# Patient Record
Sex: FEMALE | Race: WHITE | Employment: PART TIME | URBAN - METROPOLITAN AREA
[De-identification: names, ages, dates, MRNs, and addresses within clinical notes are randomized per-mention and may not be internally consistent; named-entity substitution may affect disease eponyms.]

---

## 2017-08-25 ENCOUNTER — HOSPITAL ENCOUNTER (EMERGENCY)
Facility: CLINIC | Age: 22
Discharge: HOME | End: 2017-08-25
Attending: EMERGENCY MEDICINE
Payer: COMMERCIAL

## 2017-08-25 ENCOUNTER — APPOINTMENT (OUTPATIENT)
Dept: GENERAL RADIOLOGY | Facility: CLINIC | Age: 22
End: 2017-08-25
Payer: COMMERCIAL

## 2017-08-25 VITALS
SYSTOLIC BLOOD PRESSURE: 123 MMHG | HEIGHT: 60 IN | RESPIRATION RATE: 16 BRPM | BODY MASS INDEX: 27.48 KG/M2 | TEMPERATURE: 97.8 F | DIASTOLIC BLOOD PRESSURE: 73 MMHG | HEART RATE: 60 BPM | WEIGHT: 140 LBS | OXYGEN SATURATION: 97 %

## 2017-08-25 DIAGNOSIS — M54.6 ACUTE MIDLINE THORACIC BACK PAIN: Primary | ICD-10-CM

## 2017-08-25 PROCEDURE — 72072 X-RAY EXAM THORAC SPINE 3VWS: CPT

## 2017-08-25 PROCEDURE — 99283 EMERGENCY DEPT VISIT LOW MDM: CPT

## 2017-08-25 RX ORDER — IBUPROFEN 600 MG/1
600 TABLET ORAL EVERY 6 HOURS PRN
Qty: 30 TABLET | Refills: 0 | Status: SHIPPED | OUTPATIENT
Start: 2017-08-25

## 2017-08-25 ASSESSMENT — PAIN SCALES - GENERAL: PAINLEVEL_OUTOF10: 7

## 2017-08-25 ASSESSMENT — PAIN DESCRIPTION - PAIN TYPE: TYPE: CHRONIC PAIN

## 2017-08-25 ASSESSMENT — PAIN DESCRIPTION - LOCATION: LOCATION: BACK

## 2017-08-25 ASSESSMENT — PAIN DESCRIPTION - DESCRIPTORS: DESCRIPTORS: THROBBING

## 2019-04-30 ENCOUNTER — HOSPITAL ENCOUNTER (EMERGENCY)
Facility: CLINIC | Age: 24
Discharge: HOME | End: 2019-04-30
Attending: SPECIALIST
Payer: COMMERCIAL

## 2019-04-30 VITALS
TEMPERATURE: 98.2 F | RESPIRATION RATE: 16 BRPM | OXYGEN SATURATION: 94 % | HEIGHT: 60 IN | HEART RATE: 69 BPM | WEIGHT: 135 LBS | DIASTOLIC BLOOD PRESSURE: 63 MMHG | SYSTOLIC BLOOD PRESSURE: 103 MMHG | BODY MASS INDEX: 26.5 KG/M2

## 2019-04-30 DIAGNOSIS — J02.9 VIRAL PHARYNGITIS: Primary | ICD-10-CM

## 2019-04-30 DIAGNOSIS — H92.03 OTALGIA OF BOTH EARS: ICD-10-CM

## 2019-04-30 LAB
DIRECT EXAM: NORMAL
Lab: NORMAL
SPECIMEN DESCRIPTION: NORMAL

## 2019-04-30 PROCEDURE — 99282 EMERGENCY DEPT VISIT SF MDM: CPT

## 2019-04-30 PROCEDURE — 87880 STREP A ASSAY W/OPTIC: CPT

## 2019-04-30 RX ORDER — TRAZODONE HYDROCHLORIDE 50 MG/1
50 TABLET ORAL NIGHTLY
COMMUNITY
End: 2020-08-09

## 2019-04-30 RX ORDER — IBUPROFEN 600 MG/1
600 TABLET ORAL EVERY 6 HOURS PRN
Qty: 20 TABLET | Refills: 0 | Status: SHIPPED | OUTPATIENT
Start: 2019-04-30 | End: 2019-05-07

## 2019-04-30 ASSESSMENT — PAIN DESCRIPTION - PAIN TYPE: TYPE: ACUTE PAIN

## 2019-04-30 ASSESSMENT — ENCOUNTER SYMPTOMS
COUGH: 1
TROUBLE SWALLOWING: 1
RHINORRHEA: 1
SORE THROAT: 1
SHORTNESS OF BREATH: 0
WHEEZING: 0

## 2019-04-30 ASSESSMENT — PAIN SCALES - GENERAL: PAINLEVEL_OUTOF10: 8

## 2019-04-30 ASSESSMENT — PAIN DESCRIPTION - LOCATION: LOCATION: EAR;THROAT

## 2019-04-30 NOTE — ED PROVIDER NOTES
[facility]  [address] [address]  Phone: [phone]      Pt Name: Felipa Yañez  MRN: [id number]  [place] 1995  Date of evaluation: 4/30/2019      CHIEF COMPLAINT       Chief Complaint   Patient presents with    Pharyngitis     started Saturday, painful to swallow    Otalgia     bilateral ear pain started Saturday    Nasal Congestion         HISTORY OF PRESENT ILLNESS    Felipa Yañez is a 22 y.o. female who presents   Chief Complaint   Patient presents with    Pharyngitis     started Saturday, painful to swallow    Otalgia     bilateral ear pain started Saturday    Nasal Congestion   . 71-year-old female patient presents to the emergency department complaining of sore throat since last 3 days associated with pain with the swallowing. She has had mild nonproductive cough, runny nose and congestion as well as complains of bilateral earache. She describes ear pain as a daily aching in nature 8 out of 10 in intensity. There are no exacerbating or relieving factors. She has taken Tylenol for the headache last night with slight relief. She denies any discharge or drainage from the ear,. She denies any chest pain, abdominal pain, shortness of breath, vomiting or diarrhea. She denies any sick or ill contacts or recent travels. REVIEW OF SYSTEMS       Review of Systems   Constitutional: Negative for chills and fever. HENT: Positive for congestion, rhinorrhea, sore throat and trouble swallowing. Negative for ear discharge. Respiratory: Positive for cough. Negative for shortness of breath and wheezing. Neurological: Positive for headaches. All other systems reviewed and are negative. PAST MEDICAL HISTORY    has a past medical history of Depression. SURGICAL HISTORY      has no past surgical history on file.     CURRENT MEDICATIONS       Discharge Medication List as of 4/30/2019  1:31 PM      CONTINUE these medications which have NOT CHANGED Details   traZODone (DESYREL) 50 MG tablet Take 50 mg by mouth nightlyHistorical Med      !! ibuprofen (ADVIL;MOTRIN) 600 MG tablet Take 1 tablet by mouth every 6 hours as needed for Pain, Disp-30 tablet, R-0Print       !! - Potential duplicate medications found. Please discuss with provider. ALLERGIES     has No Known Allergies. FAMILY HISTORY     indicated that the status of her maternal grandmother is unknown. She indicated that the status of her maternal grandfather is unknown. She indicated that the status of her paternal grandmother is unknown.     family history includes Diabetes in her maternal grandfather and maternal grandmother; Hypertension in her maternal grandmother; Thyroid Disease in her maternal grandmother and paternal grandmother. SOCIAL HISTORY      reports that she has never smoked. She has never used smokeless tobacco. She reports that she drinks alcohol. She reports that she does not use drugs. PHYSICAL EXAM     INITIAL VITALS:  height is 5' (1.524 m) and weight is 61.2 kg (135 lb). Her oral temperature is 98.2 °F (36.8 °C). Her blood pressure is 103/63 and her pulse is 69. Her respiration is 16 and oxygen saturation is 94%. Physical Exam   Constitutional: She is oriented to person, place, and time. She appears well-developed and well-nourished. HENT:   Head: Normocephalic and atraumatic. Nose: Nose normal.   Mouth/Throat: Oropharynx is clear and moist.   Eyes: Pupils are equal, round, and reactive to light. EOM are normal.   Neck: Normal range of motion. Neck supple. Cardiovascular: Normal rate, regular rhythm, normal heart sounds and intact distal pulses. No murmur heard. Pulmonary/Chest: Effort normal and breath sounds normal. No respiratory distress. Abdominal: Soft. Bowel sounds are normal. She exhibits no distension. There is no tenderness. Neurological: She is alert and oriented to person, place, and time. Skin: Skin is warm and dry.    Nursing note and vitals reviewed. DIFFERENTIAL DIAGNOSIS/ MDM:     Viral URI with cough, bronchitis, streptococcal pharyngitis, otalgia    DIAGNOSTIC RESULTS     EKG: All EKG's are interpreted by the Emergency Department Physician who either signs or Co-signs this chart in the absence of a cardiologist.    None obtained    RADIOLOGY:   I directly visualized the following  images and reviewed the radiologist interpretations:  No orders to display      None obtained      LABS:  Labs Reviewed   STREP SCREEN GROUP A THROAT       Rapid strep screen is negative    EMERGENCY DEPARTMENT COURSE:   Vitals:    Vitals:    04/30/19 1204   BP: 103/63   Pulse: 69   Resp: 16   Temp: 98.2 °F (36.8 °C)   TempSrc: Oral   SpO2: 94%   Weight: 61.2 kg (135 lb)   Height: 5' (1.524 m)     -------------------------  BP: 103/63, Temp: 98.2 °F (36.8 °C), Pulse: 69, Resp: 16    Orders Placed This Encounter   Medications    ibuprofen (IBU) 600 MG tablet     Sig: Take 1 tablet by mouth every 6 hours as needed for Pain     Dispense:  20 tablet     Refill:  0       Patient appears to have viral illness and will be treated symptomatically at this time. She is advised take Tylenol and/or ibuprofen as needed for the pain or headache, plenty of oral fluids, follow-up with PCP, return if worse. I have reviewed the disposition diagnosis with the patient and or their family/guardian. I have answered their questions and given discharge instructions. They voiced understanding of these instructions and did not have any further questions or complaints. Re-evaluation Notes    Patient is resting comfortably and does not appear to be in any pain or distress. PROCEDURES:  None    FINAL IMPRESSION      1. Viral pharyngitis    2.  Otalgia of both ears          DISPOSITION/PLAN   DISPOSITION Decision To Discharge 04/30/2019 01:30:35 PM      Condition on Disposition    Stable    PATIENT REFERRED TO:  Christy Gould MD  [address] [place] [address] [address]  [phone]    Call in 2 days  For reevaluation of current symptoms    [facility]  [address] [place]   [phone]    If symptoms worsen      DISCHARGE MEDICATIONS:  Discharge Medication List as of 4/30/2019  1:31 PM      START taking these medications    Details   !! ibuprofen (IBU) 600 MG tablet Take 1 tablet by mouth every 6 hours as needed for Pain, Disp-20 tablet, R-0Print       !! - Potential duplicate medications found. Please discuss with provider. (Please note that portions of this note were completed with a voice recognition program.  Efforts were made to edit the dictations but occasionally words are mis-transcribed.)    Vieyra MD, F.A.C.E.P.   Attending Emergency Physician     Aryan Miller MD  04/30/19 0175

## 2020-08-09 ENCOUNTER — HOSPITAL ENCOUNTER (EMERGENCY)
Facility: CLINIC | Age: 25
Discharge: HOME | End: 2020-08-09
Attending: EMERGENCY MEDICINE
Payer: MEDICAID

## 2020-08-09 VITALS
DIASTOLIC BLOOD PRESSURE: 86 MMHG | HEIGHT: 60 IN | BODY MASS INDEX: 25.52 KG/M2 | OXYGEN SATURATION: 99 % | TEMPERATURE: 98.5 F | HEART RATE: 71 BPM | WEIGHT: 130 LBS | SYSTOLIC BLOOD PRESSURE: 120 MMHG | RESPIRATION RATE: 16 BRPM

## 2020-08-09 LAB
-: ABNORMAL
ABSOLUTE EOS #: 0.1 K/UL (ref 0–0.4)
ABSOLUTE IMMATURE GRANULOCYTE: ABNORMAL K/UL (ref 0–0.3)
ABSOLUTE LYMPH #: 1.3 K/UL (ref 1–4.8)
ABSOLUTE MONO #: 0.6 K/UL (ref 0.1–1.2)
AMORPHOUS: ABNORMAL
ANION GAP SERPL CALCULATED.3IONS-SCNC: 8 MMOL/L (ref 9–17)
BACTERIA: ABNORMAL
BASOPHILS # BLD: 2 % (ref 0–2)
BASOPHILS ABSOLUTE: 0.2 K/UL (ref 0–0.2)
BILIRUBIN URINE: NEGATIVE
BUN BLDV-MCNC: 10 MG/DL (ref 6–20)
BUN/CREAT BLD: ABNORMAL (ref 9–20)
CALCIUM SERPL-MCNC: 9.5 MG/DL (ref 8.6–10.4)
CASTS UA: ABNORMAL /LPF (ref 0–2)
CHLORIDE BLD-SCNC: 103 MMOL/L (ref 98–107)
CO2: 27 MMOL/L (ref 20–31)
COLOR: YELLOW
COMMENT UA: ABNORMAL
CREAT SERPL-MCNC: 0.6 MG/DL (ref 0.5–0.9)
CRYSTALS, UA: ABNORMAL /HPF
DIFFERENTIAL TYPE: ABNORMAL
EOSINOPHILS RELATIVE PERCENT: 1 % (ref 1–4)
EPITHELIAL CELLS UA: ABNORMAL /HPF (ref 0–5)
GFR AFRICAN AMERICAN: >60 ML/MIN
GFR NON-AFRICAN AMERICAN: >60 ML/MIN
GFR SERPL CREATININE-BSD FRML MDRD: ABNORMAL ML/MIN/{1.73_M2}
GFR SERPL CREATININE-BSD FRML MDRD: ABNORMAL ML/MIN/{1.73_M2}
GLUCOSE BLD-MCNC: 92 MG/DL (ref 70–99)
GLUCOSE URINE: NEGATIVE
HCG(URINE) PREGNANCY TEST: NEGATIVE
HCT VFR BLD CALC: 42 % (ref 36–46)
HEMOGLOBIN: 13.8 G/DL (ref 12–16)
IMMATURE GRANULOCYTES: ABNORMAL %
KETONES, URINE: ABNORMAL
LEUKOCYTE ESTERASE, URINE: NEGATIVE
LYMPHOCYTES # BLD: 14 % (ref 24–44)
MCH RBC QN AUTO: 29.9 PG (ref 26–34)
MCHC RBC AUTO-ENTMCNC: 32.9 G/DL (ref 31–37)
MCV RBC AUTO: 90.7 FL (ref 80–100)
MONOCYTES # BLD: 6 % (ref 2–11)
MUCUS: ABNORMAL
NITRITE, URINE: NEGATIVE
NRBC AUTOMATED: ABNORMAL PER 100 WBC
OTHER OBSERVATIONS UA: ABNORMAL
PDW BLD-RTO: 13.1 % (ref 12.5–15.4)
PH UA: 7 (ref 5–8)
PLATELET # BLD: 212 K/UL (ref 140–450)
PLATELET ESTIMATE: ABNORMAL
PMV BLD AUTO: 8.1 FL (ref 6–12)
POTASSIUM SERPL-SCNC: 4.3 MMOL/L (ref 3.7–5.3)
PROTEIN UA: NEGATIVE
RBC # BLD: 4.63 M/UL (ref 4–5.2)
RBC # BLD: ABNORMAL 10*6/UL
RBC UA: ABNORMAL /HPF (ref 0–2)
RENAL EPITHELIAL, UA: ABNORMAL /HPF
SEG NEUTROPHILS: 77 % (ref 36–66)
SEGMENTED NEUTROPHILS ABSOLUTE COUNT: 7.2 K/UL (ref 1.8–7.7)
SODIUM BLD-SCNC: 138 MMOL/L (ref 135–144)
SPECIFIC GRAVITY UA: 1.01 (ref 1–1.03)
TRICHOMONAS: ABNORMAL
TURBIDITY: CLEAR
URINE HGB: ABNORMAL
UROBILINOGEN, URINE: NORMAL
WBC # BLD: 9.5 K/UL (ref 3.5–11)
WBC # BLD: ABNORMAL 10*3/UL
WBC UA: ABNORMAL /HPF (ref 0–5)
YEAST: ABNORMAL

## 2020-08-09 PROCEDURE — 81025 URINE PREGNANCY TEST: CPT

## 2020-08-09 PROCEDURE — 80048 BASIC METABOLIC PNL TOTAL CA: CPT

## 2020-08-09 PROCEDURE — 99284 EMERGENCY DEPT VISIT MOD MDM: CPT

## 2020-08-09 PROCEDURE — 85025 COMPLETE CBC W/AUTO DIFF WBC: CPT

## 2020-08-09 PROCEDURE — 81001 URINALYSIS AUTO W/SCOPE: CPT

## 2020-08-09 PROCEDURE — 36415 COLL VENOUS BLD VENIPUNCTURE: CPT

## 2020-08-09 ASSESSMENT — PAIN DESCRIPTION - LOCATION: LOCATION: HEAD

## 2020-08-09 ASSESSMENT — PAIN SCALES - GENERAL: PAINLEVEL_OUTOF10: 4

## 2020-08-09 NOTE — ED PROVIDER NOTES
eMERGENCY dEPARTMENT eNCOUnter      Pt Name: Marja Boxer  MRN: [id number]  [place] 1995  Date of evaluation: 8/9/2020      CHIEF COMPLAINT       Chief Complaint   Patient presents with    Headache    Numbness     numb/tingling in limbs          HISTORY OF PRESENT Ander Baxter is a 26 y.o. female who presents emergency department for evaluation of a mild headache that she has right now she says she gets migraines but this is not migraine-like. And that she had some tingling in her limbs in her hands especially but also in her legs. She is neurologically intact she has almost no headache at this point time she has no fevers she is nontoxic looking. Patient relates that she does not drink very much water that she always drinks coffee and that she drinks a lot of soda as well. REVIEW OF SYSTEMS       PAST MEDICAL HISTORY    has a past medical history of Depression and Neurocardiogenic syncope. SURGICAL HISTORY      has no past surgical history on file. CURRENT MEDICATIONS       Previous Medications    IBUPROFEN (ADVIL;MOTRIN) 600 MG TABLET    Take 1 tablet by mouth every 6 hours as needed for Pain    IBUPROFEN (IBU) 600 MG TABLET    Take 1 tablet by mouth every 6 hours as needed for Pain       ALLERGIES     has No Known Allergies. FAMILY HISTORY     She indicated that the status of her maternal grandmother is unknown. She indicated that the status of her maternal grandfather is unknown. She indicated that the status of her paternal grandmother is unknown.     family history includes Diabetes in her maternal grandfather and maternal grandmother; Hypertension in her maternal grandmother; Thyroid Disease in her maternal grandmother and paternal grandmother. SOCIAL HISTORY      reports that she has never smoked. She has never used smokeless tobacco. She reports current alcohol use. She reports that she does not use drugs.     PHYSICAL EXAM     INITIAL VITALS:  height is 5' (1.524 m) and weight is 59 kg (130 lb). Her oral temperature is 98.5 °F (36.9 °C). Her blood pressure is 120/86 and her pulse is 71. Her respiration is 16 and oxygen saturation is 99%. Constitutional: Alert, oriented x3, nontoxic, afebrile, answering questions appropriately, acting properly for age, in no acute distress  HEENT: Extraocular muscles intact, mucus membranes moist, TMs clear bilaterally, no posterior pharyngeal erythema or exudates, Pupils equal, round, reactive to light,   Neck: Trachea midline, Supple without lymphadenopathy, no posterior midline neck tenderness to palpation  Cardiovascular: Regular rhythm and rate no S3, S4, or murmurs  Respiratory: Clear to auscultation bilaterally no wheezes, rhonchi, rales, no respiratory distress  Gastrointestinal: Soft, nontender, nondistended, positive bowel sounds. No rebound, rigidity, or guarding. Musculoskeletal: No extremity pain or swelling  Neurologic: Moving all 4 extremities without difficulty there are no gross focal neurologic deficits  Skin: Warm and dry      DIFFERENTIAL DIAGNOSIS/ MDM:     Patient has a benign physical exam we think that she probably has some mild dehydration causing her problems she relates that she does not drink much water and that she was drinks caffeine and soda. Will get some laboratories if those are negative patient will most likely be able to be discharged.     DIAGNOSTIC RESULTS     EKG: All EKG's are interpreted by the Emergency Department Physician who either signs or Co-signs this chart in the absence of a cardiologist.        Not indicated unless otherwise documented above    LABS:  Results for orders placed or performed during the hospital encounter of 08/09/20   CBC Auto Differential   Result Value Ref Range    WBC 9.5 3.5 - 11.0 k/uL    RBC 4.63 4.0 - 5.2 m/uL    Hemoglobin 13.8 12.0 - 16.0 g/dL    Hematocrit 42.0 36 - 46 %    MCV 90.7 80 - 100 fL    MCH 29.9 26 - 34 pg    MCHC 32.9 31 - 37 g/dL    RDW 13.1 12.5 - 15.4 %    Platelets 914 341 - 741 k/uL    MPV 8.1 6.0 - 12.0 fL    NRBC Automated NOT REPORTED per 100 WBC    Differential Type NOT REPORTED     Seg Neutrophils 77 (H) 36 - 66 %    Lymphocytes 14 (L) 24 - 44 %    Monocytes 6 2 - 11 %    Eosinophils % 1 1 - 4 %    Basophils 2 0 - 2 %    Immature Granulocytes NOT REPORTED 0 %    Segs Absolute 7.20 1.8 - 7.7 k/uL    Absolute Lymph # 1.30 1.0 - 4.8 k/uL    Absolute Mono # 0.60 0.1 - 1.2 k/uL    Absolute Eos # 0.10 0.0 - 0.4 k/uL    Basophils Absolute 0.20 0.0 - 0.2 k/uL    Absolute Immature Granulocyte NOT REPORTED 0.00 - 0.30 k/uL    WBC Morphology NOT REPORTED     RBC Morphology NOT REPORTED     Platelet Estimate NOT REPORTED    Basic Metabolic Panel   Result Value Ref Range    Glucose 92 70 - 99 mg/dL    BUN 10 6 - 20 mg/dL    CREATININE 0.60 0.50 - 0.90 mg/dL    Bun/Cre Ratio NOT REPORTED 9 - 20    Calcium 9.5 8.6 - 10.4 mg/dL    Sodium 138 135 - 144 mmol/L    Potassium 4.3 3.7 - 5.3 mmol/L    Chloride 103 98 - 107 mmol/L    CO2 27 20 - 31 mmol/L    Anion Gap 8 (L) 9 - 17 mmol/L    GFR Non-African American >60 >60 mL/min    GFR African American >60 >60 mL/min    GFR Comment          GFR Staging NOT REPORTED    Urinalysis Reflex to Culture    Specimen: Urine, clean catch   Result Value Ref Range    Color, UA YELLOW YELLOW    Turbidity UA CLEAR CLEAR    Glucose, Ur NEGATIVE NEGATIVE    Bilirubin Urine NEGATIVE NEGATIVE    Ketones, Urine TRACE (A) NEGATIVE    Specific Gravity, UA 1.015 1.005 - 1.030    Urine Hgb SMALL (A) NEGATIVE    pH, UA 7.0 5.0 - 8.0    Protein, UA NEGATIVE NEGATIVE    Urobilinogen, Urine Normal Normal    Nitrite, Urine NEGATIVE NEGATIVE    Leukocyte Esterase, Urine NEGATIVE NEGATIVE    Urinalysis Comments NOT REPORTED    Pregnancy, Urine   Result Value Ref Range    HCG(Urine) Pregnancy Test NEGATIVE NEGATIVE   Microscopic Urinalysis   Result Value Ref Range    -          WBC, UA 0 TO 2 0 - 5 /HPF    RBC, UA 0 TO 2 0 - 2 /HPF    Casts UA NOT REPORTED 0 - 2 /LPF    Crystals, UA NOT REPORTED None /HPF    Epithelial Cells UA 0 TO 2 0 - 5 /HPF    Renal Epithelial, UA NOT REPORTED 0 /HPF    Bacteria, UA None None    Mucus, UA NOT REPORTED None    Trichomonas, UA NOT REPORTED None    Amorphous, UA NOT REPORTED None    Other Observations UA (A) NOT REQ. Utilizing a urinalysis as the only screening method to exclude a potential uropathogen can be unreliable in many patient populations. Rapid screening tests are less sensitive than culture and if UTI is a clinical possibility, culture should be considered despite a negative urinalysis. Yeast, UA NOT REPORTED None       Not indicated unless otherwise documented above    RADIOLOGY:   I reviewed the radiologist interpretations:  No orders to display       Not indicated unless otherwise documented above    EMERGENCY DEPARTMENT COURSE:     The patient was given the following medications:  No orders of the defined types were placed in this encounter. Vitals:    Vitals:    08/09/20 1843 08/09/20 2004   BP: 121/79 120/86   Pulse: 69 71   Resp: 20 16   Temp: 98.6 °F (37 °C) 98.5 °F (36.9 °C)   TempSrc: Oral Oral   SpO2: 100% 99%   Weight: 59 kg (130 lb)    Height: 5' (1.524 m)      -------------------------  /86   Pulse 71   Temp 98.5 °F (36.9 °C) (Oral)   Resp 16   Ht 5' (1.524 m)   Wt 59 kg (130 lb)   LMP 07/02/2020 (Approximate)   SpO2 99%   BMI 25.39 kg/m²         I have reviewed the disposition diagnosis with the patient and or their family/guardian. I have answered their questions and given discharge instructions. They voiced understanding of these instructions and did not have any further questions or complaints. CRITICAL CARE:    None    CONSULTS:    None    PROCEDURES:    None      OARRS Report if indicated             FINAL IMPRESSION      1.  Dehydration          DISPOSITION/PLAN   DISPOSITION Decision To Discharge    I have reviewed the disposition diagnosis with the patient and or their family/guardian. I have answered their questions and given discharge instructions. They voiced understanding of these instructions and did not have any further questions or complaints. Reevaluation: Patient stating she feels better headache is gone she feels like she could probably go home her laboratories have been explained to her as negative for any acute problem. We believe she needs to taking a lot more water and we are recommending 10 to 12 glasses of water a day and follow-up with the family physician holding on caffeine and soda.     PATIENT REFERRED TO:  Ann Cerna, [address] [place] [address] [address] [place] [address]  [phone]    In 2 days        DISCHARGE MEDICATIONS:  New Prescriptions    No medications on file       (Please note that portions of this note were completed with a voice recognition program.  Efforts were made to edit the dictations but occasionally words are mis-transcribed.)    [place] MD  Attending Emergency Physician           Vane Askew MD  08/09/20 9247

## 2020-08-09 NOTE — ED NOTES
Pt presents to ED today complaining of a headache and numbness and tingling in her arms and legs since 0800. Patient states she took Tylenol without relief. Patient states she feels generally weak. Patient ambulated to exam room without difficulty, no s/s of distress. Patient denies cough, fever, and abdominal pain. Comfort offered, call light in reach.       Barbara Hein RN  08/09/20 6044

## 2021-01-29 ENCOUNTER — HOSPITAL ENCOUNTER (EMERGENCY)
Facility: CLINIC | Age: 26
Discharge: HOME | End: 2021-01-29
Attending: EMERGENCY MEDICINE
Payer: COMMERCIAL

## 2021-01-29 VITALS
WEIGHT: 127 LBS | DIASTOLIC BLOOD PRESSURE: 81 MMHG | HEART RATE: 76 BPM | RESPIRATION RATE: 12 BRPM | SYSTOLIC BLOOD PRESSURE: 133 MMHG | BODY MASS INDEX: 24.94 KG/M2 | OXYGEN SATURATION: 99 % | HEIGHT: 60 IN | TEMPERATURE: 98.3 F

## 2021-01-29 DIAGNOSIS — N92.6 MISSED MENSES: Primary | ICD-10-CM

## 2021-01-29 LAB
BILIRUBIN URINE: NEGATIVE
COLOR: YELLOW
COMMENT UA: ABNORMAL
GLUCOSE URINE: NEGATIVE
HCG(URINE) PREGNANCY TEST: NEGATIVE
KETONES, URINE: NEGATIVE
LEUKOCYTE ESTERASE, URINE: NEGATIVE
NITRITE, URINE: NEGATIVE
PH UA: 5 (ref 5–8)
PROTEIN UA: NEGATIVE
SPECIFIC GRAVITY UA: 1.03 (ref 1–1.03)
TURBIDITY: CLEAR
URINE HGB: NEGATIVE
UROBILINOGEN, URINE: NORMAL

## 2021-01-29 PROCEDURE — 81003 URINALYSIS AUTO W/O SCOPE: CPT

## 2021-01-29 PROCEDURE — 99282 EMERGENCY DEPT VISIT SF MDM: CPT

## 2021-01-29 PROCEDURE — 81025 URINE PREGNANCY TEST: CPT

## 2021-01-29 ASSESSMENT — ENCOUNTER SYMPTOMS
EYE PAIN: 0
NAUSEA: 0
BACK PAIN: 0
DIARRHEA: 0
ABDOMINAL PAIN: 0
VOMITING: 0
SHORTNESS OF BREATH: 0
COUGH: 0

## 2021-01-29 NOTE — ED PROVIDER NOTES
[facility]  [facility]  Phone: [phone]        Pt Name: Jesi Murphy  MRN: [id number]  [place] 1995  Date of evaluation: 1/29/21      CHIEF COMPLAINT       Chief Complaint   Patient presents with    Pregnancy Test     3.5 weeks late         [address] Dhiraj Emery is a 26 y.o. female who presents with a chief complaint of late on her menstrual period she is three and half weeks late she is normally not late she has been pregnant in the past twice before she said she is taken a couple pregnancy tests at home that have been positive in a couple that are negative she is not sure what is going on no vaginal discharge no fevers no chills no cough no shortness of breath or other symptoms      REVIEW OF SYSTEMS         Review of Systems   Constitutional: Negative for chills and fever. HENT: Negative for congestion and ear pain. Eyes: Negative for pain and visual disturbance. Respiratory: Negative for cough and shortness of breath. Cardiovascular: Negative for chest pain and leg swelling. Gastrointestinal: Negative for abdominal pain, diarrhea, nausea and vomiting. Endocrine: Negative for polydipsia and polyuria. Genitourinary: Negative for difficulty urinating, dysuria, frequency, vaginal bleeding and vaginal discharge. Late on her period 3-1/2 weeks   Musculoskeletal: Negative for back pain, joint swelling, myalgias, neck pain and neck stiffness. Skin: Negative for rash. Neurological: Negative for dizziness, weakness and headaches. Hematological: Negative for adenopathy. Does not bruise/bleed easily. Psychiatric/Behavioral: Negative for suicidal ideas. PAST MEDICAL HISTORY    has a past medical history of Depression and Neurocardiogenic syncope. SURGICAL HISTORY      has no past surgical history on file.     CURRENT MEDICATIONS       Previous Medications    IBUPROFEN (ADVIL;MOTRIN) 600 MG TABLET    Take 1 tablet by mouth every 6 hours as needed for Pain    IBUPROFEN (IBU) 600 MG TABLET    Take 1 tablet by mouth every 6 hours as needed for Pain       ALLERGIES     has No Known Allergies. FAMILY HISTORY     She indicated that the status of her maternal grandmother is unknown. She indicated that the status of her maternal grandfather is unknown. She indicated that the status of her paternal grandmother is unknown.     family history includes Diabetes in her maternal grandfather and maternal grandmother; Hypertension in her maternal grandmother; Thyroid Disease in her maternal grandmother and paternal grandmother. SOCIAL HISTORY      reports that she has never smoked. She has never used smokeless tobacco. She reports current alcohol use. She reports that she does not use drugs. PHYSICAL EXAM     INITIAL VITALS:  height is 5' (1.524 m) and weight is 57.6 kg (127 lb). Her oral temperature is 98.3 °F (36.8 °C). Her blood pressure is 133/81 and her pulse is 76. Her respiration is 12 and oxygen saturation is 99%. Physical Exam  Constitutional:       General: She is not in acute distress. Appearance: Normal appearance. She is well-developed. She is not ill-appearing, toxic-appearing or diaphoretic. HENT:      Head: Normocephalic and atraumatic. Left Ear: External ear normal.   Eyes:      Conjunctiva/sclera: Conjunctivae normal.      Pupils: Pupils are equal, round, and reactive to light. Neck:      Musculoskeletal: Normal range of motion. Cardiovascular:      Rate and Rhythm: Normal rate and regular rhythm. Pulmonary:      Effort: Pulmonary effort is normal.      Breath sounds: Normal breath sounds. Abdominal:      General: Bowel sounds are normal.      Palpations: Abdomen is soft. Tenderness: There is no abdominal tenderness. There is no guarding. Musculoskeletal: Normal range of motion. General: No tenderness. Skin:     General: Skin is warm and dry.    Neurological:      Mental Status: She is alert and oriented to person, place, and time. Psychiatric:         Behavior: Behavior normal.           DIFFERENTIAL DIAGNOSIS/ MDM:     Irregular. Rule out pregnancy patient has no abdominal pain will do urine pregnancy    DIAGNOSTIC RESULTS     EKG: All EKG's are interpreted by the Emergency Department Physician who either signs or Co-signs this chart in the absence of a cardiologist.        RADIOLOGY:   Non-plain film images such as CT, Ultrasound and MRI are read by the radiologist. [facility] radiographic images are visualized and the radiologist interpretations are reviewed as follows:         LABS:  Results for orders placed or performed during the hospital encounter of 01/29/21   Urinalysis Reflex to Culture    Specimen: Urine, clean catch   Result Value Ref Range    Color, UA YELLOW YELLOW    Turbidity UA CLEAR CLEAR    Glucose, Ur NEGATIVE NEGATIVE    Bilirubin Urine NEGATIVE NEGATIVE    Ketones, Urine NEGATIVE NEGATIVE    Specific Gravity, UA 1.033 (H) 1.005 - 1.030    Urine Hgb NEGATIVE NEGATIVE    pH, UA 5.0 5.0 - 8.0    Protein, UA NEGATIVE NEGATIVE    Urobilinogen, Urine Normal Normal    Nitrite, Urine NEGATIVE NEGATIVE    Leukocyte Esterase, Urine NEGATIVE NEGATIVE    Urinalysis Comments       Microscopic exam not performed based on chemical results unless requested in original order. Urinalysis Comments          Urinalysis Comments       Utilizing a urinalysis as the only screening method to exclude a potential uropathogen can be unreliable in many patient populations. Rapid screening tests are less sensitive than culture and if UTI is a clinical possibility, culture should be considered despite a negative urinalysis.    PREGNANCY, URINE   Result Value Ref Range    HCG(Urine) Pregnancy Test NEGATIVE NEGATIVE           EMERGENCY DEPARTMENT COURSE:   Vitals:    Vitals:    01/29/21 1353   BP: 133/81   Pulse: 76   Resp: 12   Temp: 98.3 °F (36.8 °C)   TempSrc: Oral   SpO2: 99%   Weight: 57.6 kg (127 lb)   Height: 5' (1.524 m)     -------------------------  BP: 133/81, Temp: 98.3 °F (36.8 °C), Pulse: 76, Resp: 12          CONSULTS:    PROCEDURES:  None    FINAL IMPRESSION      1. Missed menses          DISPOSITION/PLAN   Discharged in stable condition    PATIENT REFERRED TO:  Refugio Chow, [address] [place] [address] [address] [place] [address]  [phone]    In 3 days        DISCHARGE MEDICATIONS:  New Prescriptions    No medications on file       (Please note that portions of this note were completed with a voice recognition program.  Efforts were made to edit the dictations but occasionally words are mis-transcribed.)    Tillman MD, F.A.A.E.M.   Attending Emergency Medicine Physician      Jhonathan Nayak MD  01/29/21 2648

## 2021-02-05 ENCOUNTER — APPOINTMENT (OUTPATIENT)
Dept: GENERAL RADIOLOGY | Facility: CLINIC | Age: 26
End: 2021-02-05
Payer: COMMERCIAL

## 2021-02-05 ENCOUNTER — HOSPITAL ENCOUNTER (EMERGENCY)
Facility: CLINIC | Age: 26
Discharge: HOME | End: 2021-02-06
Attending: EMERGENCY MEDICINE
Payer: COMMERCIAL

## 2021-02-05 VITALS
OXYGEN SATURATION: 99 % | HEART RATE: 64 BPM | RESPIRATION RATE: 16 BRPM | TEMPERATURE: 97.7 F | DIASTOLIC BLOOD PRESSURE: 75 MMHG | SYSTOLIC BLOOD PRESSURE: 127 MMHG | BODY MASS INDEX: 24.94 KG/M2 | HEIGHT: 60 IN | WEIGHT: 127 LBS

## 2021-02-05 DIAGNOSIS — S93.519A SPRAIN OF INTERPHALANGEAL JOINT OF TOE, INITIAL ENCOUNTER: Primary | ICD-10-CM

## 2021-02-05 PROCEDURE — 73630 X-RAY EXAM OF FOOT: CPT

## 2021-02-05 PROCEDURE — 6370000000 HC RX 637 (ALT 250 FOR IP): Performed by: EMERGENCY MEDICINE

## 2021-02-05 PROCEDURE — 99284 EMERGENCY DEPT VISIT MOD MDM: CPT

## 2021-02-05 RX ORDER — HYDROCODONE BITARTRATE AND ACETAMINOPHEN 5; 325 MG/1; MG/1
2 TABLET ORAL ONCE
Status: COMPLETED | OUTPATIENT
Start: 2021-02-06 | End: 2021-02-05

## 2021-02-05 RX ADMIN — HYDROCODONE BITARTRATE AND ACETAMINOPHEN 2 TABLET: 5; 325 TABLET ORAL at 23:37

## 2021-02-05 ASSESSMENT — PAIN SCALES - GENERAL: PAINLEVEL_OUTOF10: 8

## 2021-02-05 ASSESSMENT — PAIN DESCRIPTION - PAIN TYPE: TYPE: ACUTE PAIN

## 2021-02-05 ASSESSMENT — PAIN DESCRIPTION - DESCRIPTORS: DESCRIPTORS: ACHING;SHARP;SHOOTING;THROBBING

## 2021-02-05 NOTE — LETTER
[facility]  [address] [facility]  Phone: [phone]               February 6, 2021    Patient: Larry Hathaway   YOB: 1995   Date of Visit: 2/5/2021       To Whom It May Concern:    Michelle Osman was seen and treated in our emergency department on 2/5/2021. She may return to work on 02/07/21 but allow her one week to heal this sprain of the foot/toe. .      Sincerely,       Kei Hutchins MD         Signature:__________________________________

## 2021-02-06 NOTE — ED PROVIDER NOTES
[address]  EMERGENCY DEPARTMENT ENCOUNTER      Pt Name: Ok Moreau  MRN: [id number]  [place] 1995  Date of evaluation: 2/5/2021  Provider: Jeanne Hwang MD    [address] [place] [address]       Chief Complaint   Patient presents with    Toe Injury     great toe on right foot       HISTORY OF PRESENT ILLNESS  (Location/Symptom, Timing/Onset, Context/Setting, Quality, Duration, Modifying Factors, Severity.)   Ok Moreau is a 26 y.o. female who presents to the emergency department complaining of an injury to the right great toe. She slipped on some steps and relates she is not exactly sure what happened but thinks maybe her foot slipped underneath her. It is the right great toe that is bothering her. No other toes are hurting. The foot itself is fine. She denies any ankle pain. She did take some Motrin earlier in the afternoon. Nursing Notes were reviewed. REVIEW OF SYSTEMS    (2-9 systems for level 4, 10 or more for level 5)     Review of Systems   Musculoskeletal:        R great toe injury and pain   All other systems reviewed and are negative. Except as noted above the remainder of the review of systems was reviewed and negative. PAST MEDICAL HISTORY     Past Medical History:   Diagnosis Date    Depression     Neurocardiogenic syncope        SURGICAL HISTORY     History reviewed. No pertinent surgical history. CURRENT MEDICATIONS       Discharge Medication List as of 2/6/2021 12:35 AM      CONTINUE these medications which have NOT CHANGED    Details   ibuprofen (ADVIL;MOTRIN) 600 MG tablet Take 1 tablet by mouth every 6 hours as needed for Pain, Disp-30 tablet, R-0Print             ALLERGIES     Patient has no known allergies.     FAMILY HISTORY           Problem Relation Age of Onset    Thyroid Disease Paternal Grandmother     Diabetes Maternal Grandmother     Hypertension Maternal Grandmother     Thyroid Disease Maternal Grandmother     Diabetes Maternal Grandfather      Family Status   Relation Name Status    PGM  (Not Specified)    MGM  (Not Specified)    MGF  (Not Specified)        SOCIAL HISTORY      reports that she has never smoked. She has never used smokeless tobacco. She reports current alcohol use. She reports that she does not use drugs. PHYSICAL EXAM    (up to 7 for level 4, 8 or more for level 5)     Physical Exam  Constitutional:       General: She is not in acute distress. Appearance: She is well-developed. She is not ill-appearing, toxic-appearing or diaphoretic. HENT:      Head: Normocephalic and atraumatic. Right Ear: External ear normal.      Left Ear: External ear normal.      Nose: Nose normal.      Mouth/Throat:      Mouth: Mucous membranes are moist.   Eyes:      General:         Right eye: No discharge. Left eye: No discharge. Conjunctiva/sclera: Conjunctivae normal.      Pupils: Pupils are equal, round, and reactive to light. Neck:      Musculoskeletal: Normal range of motion and neck supple. Cardiovascular:      Rate and Rhythm: Normal rate and regular rhythm. Heart sounds: Normal heart sounds. No murmur. Pulmonary:      Effort: Pulmonary effort is normal. No respiratory distress. Breath sounds: Normal breath sounds. No wheezing or rales. Abdominal:      General: Bowel sounds are normal. There is no distension. Palpations: Abdomen is soft. There is no mass. Tenderness: There is no abdominal tenderness. Musculoskeletal: Normal range of motion. General: Tenderness and signs of injury present. No swelling or deformity. Right lower leg: No edema. Left lower leg: No edema. Skin:     General: Skin is warm. Findings: No rash. Neurological:      General: No focal deficit present. Mental Status: She is alert and oriented to person, place, and time. Motor: No abnormal muscle tone.    Psychiatric:         Mood and Affect: Mood normal.         Behavior: Behavior normal.         DIAGNOSTIC RESULTS     EKG: All EKG's are interpreted by the Emergency Department Physician who either signs or Co-signs this chart in the absence of a cardiologist.    none    RADIOLOGY:   Non-plain film images such as CT, Ultrasound and MRI are read by the radiologist. Plain radiographic images are visualized and preliminarily interpreted by the emergency physician with the below findings:    Interpretation per the Radiologist below, if available at the time of this note:    XR FOOT RIGHT (MIN 3 VIEWS)   Final Result   Unremarkable radiographic views of the right foot. ED BEDSIDE ULTRASOUND:   Performed by ED Physician - none    LABS:  Labs Reviewed - No data to display    All other labs were within normal range or not returned as of this dictation. EMERGENCY DEPARTMENT COURSE and DIFFERENTIAL DIAGNOSIS/MDM:   Vitals:    Vitals:    02/05/21 2320   BP: 127/75   Pulse: 64   Resp: 16   Temp: 97.7 °F (36.5 °C)   TempSrc: Oral   SpO2: 99%   Weight: 57.6 kg (127 lb)   Height: 5' (1.524 m)     We did discuss an x-ray and something for pain here. She is agreeable. I have answered any questions they have at this time. We did go over x-ray and they know to follow-up with family physician for further concerns. CONSULTS:  None    PROCEDURES:  None    FINAL IMPRESSION      1.  Sprain of interphalangeal joint of toe, initial encounter          DISPOSITION/PLAN   DISPOSITION Home      PATIENT REFERRED TO:  Deepti Alicia MD  [address] [place] [id number]  [phone]    Call in 1 day  As needed      DISCHARGE MEDICATIONS:  Discharge Medication List as of 2/6/2021 12:35 AM          (Please note that portions of this note were completed with a voice recognition program.  Efforts were made to edit the dictations but occasionally words are mis-transcribed.)    August Salazar MD  Attending Emergency Physician        August Salazar MD  02/06/21 1254 Rodney Price MD  02/06/21 9199

## 2021-02-06 NOTE — ED NOTES
Mode of arrival (squad #, walk in, police, etc) : walk in from home        Chief complaint(s): great right toe injury        Arrival Note (brief scenario, treatment PTA, etc). : Pt presented to the ED c/o great right toe injury. Pt states she was doing laundry around 1400 today when she fell down two steps and stubbed her right great toe. Pt states she took 800 mg of ibuprofen at 1430 with little to no relief. Pt states she also iced her toe. Pt states it is painful to move her toe, pt states the toe feels numb at this time. C= \"Have you ever felt that you should Cut down on your drinking? \"  No  A= \"Have people Annoyed you by criticizing your drinking? \"  No  G= \"Have you ever felt bad or Guilty about your drinking? \"  No  E= \"Have you ever had a drink as an Eye-opener first thing in the morning to steady your nerves or to help a hangover? \"  No      Deferred []      Reason for deferring: N/A    *If yes to two or more: probable alcohol abuse. Santos Nunez RN  02/05/21 0556

## 2022-09-07 ENCOUNTER — APPOINTMENT (OUTPATIENT)
Dept: CT IMAGING | Facility: CLINIC | Age: 27
End: 2022-09-07
Payer: COMMERCIAL

## 2022-09-07 ENCOUNTER — HOSPITAL ENCOUNTER (EMERGENCY)
Facility: CLINIC | Age: 27
Discharge: HOME | End: 2022-09-07
Attending: EMERGENCY MEDICINE
Payer: COMMERCIAL

## 2022-09-07 VITALS
TEMPERATURE: 98.4 F | WEIGHT: 132 LBS | OXYGEN SATURATION: 100 % | RESPIRATION RATE: 16 BRPM | SYSTOLIC BLOOD PRESSURE: 135 MMHG | BODY MASS INDEX: 26.61 KG/M2 | HEART RATE: 80 BPM | HEIGHT: 59 IN | DIASTOLIC BLOOD PRESSURE: 80 MMHG

## 2022-09-07 DIAGNOSIS — R10.31 RIGHT LOWER QUADRANT ABDOMINAL PAIN: Primary | ICD-10-CM

## 2022-09-07 LAB
ABSOLUTE EOS #: 0.1 K/UL (ref 0–0.4)
ABSOLUTE LYMPH #: 2.1 K/UL (ref 1–4.8)
ABSOLUTE MONO #: 0.7 K/UL (ref 0.1–1.2)
ALBUMIN SERPL-MCNC: 4.6 G/DL (ref 3.5–5.2)
ALBUMIN/GLOBULIN RATIO: 1.6 (ref 1–2.5)
ALP BLD-CCNC: 76 U/L (ref 35–104)
ALT SERPL-CCNC: 13 U/L (ref 5–33)
ANION GAP SERPL CALCULATED.3IONS-SCNC: 9 MMOL/L (ref 9–17)
AST SERPL-CCNC: 19 U/L
BASOPHILS # BLD: 1 % (ref 0–2)
BASOPHILS ABSOLUTE: 0 K/UL (ref 0–0.2)
BILIRUB SERPL-MCNC: <0.1 MG/DL (ref 0.3–1.2)
BILIRUBIN URINE: NEGATIVE
BUN BLDV-MCNC: 13 MG/DL (ref 6–20)
CALCIUM SERPL-MCNC: 9.7 MG/DL (ref 8.6–10.4)
CHLORIDE BLD-SCNC: 102 MMOL/L (ref 98–107)
CO2: 29 MMOL/L (ref 20–31)
COLOR: YELLOW
COMMENT UA: NORMAL
CREAT SERPL-MCNC: 0.6 MG/DL (ref 0.5–0.9)
EOSINOPHILS RELATIVE PERCENT: 2 % (ref 1–4)
GFR AFRICAN AMERICAN: >60 ML/MIN
GFR NON-AFRICAN AMERICAN: >60 ML/MIN
GFR SERPL CREATININE-BSD FRML MDRD: ABNORMAL ML/MIN/{1.73_M2}
GLUCOSE BLD-MCNC: 83 MG/DL (ref 70–99)
GLUCOSE URINE: NEGATIVE
HCG QUALITATIVE: NEGATIVE
HCT VFR BLD CALC: 39.6 % (ref 36–46)
HEMOGLOBIN: 12.9 G/DL (ref 12–16)
KETONES, URINE: NEGATIVE
LEUKOCYTE ESTERASE, URINE: NEGATIVE
LIPASE: 28 U/L (ref 13–60)
LYMPHOCYTES # BLD: 24 % (ref 24–44)
MCH RBC QN AUTO: 29.2 PG (ref 26–34)
MCHC RBC AUTO-ENTMCNC: 32.6 G/DL (ref 31–37)
MCV RBC AUTO: 89.5 FL (ref 80–100)
MONOCYTES # BLD: 8 % (ref 2–11)
NITRITE, URINE: NEGATIVE
PDW BLD-RTO: 13.4 % (ref 12.5–15.4)
PH UA: 5.5 (ref 5–8)
PLATELET # BLD: 206 K/UL (ref 140–450)
PMV BLD AUTO: 8.4 FL (ref 6–12)
POTASSIUM SERPL-SCNC: 4.4 MMOL/L (ref 3.7–5.3)
PROTEIN UA: NEGATIVE
RBC # BLD: 4.43 M/UL (ref 4–5.2)
SEG NEUTROPHILS: 65 % (ref 36–66)
SEGMENTED NEUTROPHILS ABSOLUTE COUNT: 5.8 K/UL (ref 1.8–7.7)
SODIUM BLD-SCNC: 140 MMOL/L (ref 135–144)
SPECIFIC GRAVITY UA: 1.01 (ref 1–1.03)
TOTAL PROTEIN: 7.4 G/DL (ref 6.4–8.3)
TURBIDITY: CLEAR
URINE HGB: NEGATIVE
UROBILINOGEN, URINE: NORMAL
WBC # BLD: 8.7 K/UL (ref 3.5–11)

## 2022-09-07 PROCEDURE — 84703 CHORIONIC GONADOTROPIN ASSAY: CPT

## 2022-09-07 PROCEDURE — 6360000002 HC RX W HCPCS: Performed by: NURSE PRACTITIONER

## 2022-09-07 PROCEDURE — 83690 ASSAY OF LIPASE: CPT

## 2022-09-07 PROCEDURE — 96374 THER/PROPH/DIAG INJ IV PUSH: CPT

## 2022-09-07 PROCEDURE — 2580000003 HC RX 258: Performed by: NURSE PRACTITIONER

## 2022-09-07 PROCEDURE — 80053 COMPREHEN METABOLIC PANEL: CPT

## 2022-09-07 PROCEDURE — 81003 URINALYSIS AUTO W/O SCOPE: CPT

## 2022-09-07 PROCEDURE — 99285 EMERGENCY DEPT VISIT HI MDM: CPT

## 2022-09-07 PROCEDURE — 85025 COMPLETE CBC W/AUTO DIFF WBC: CPT

## 2022-09-07 PROCEDURE — 36415 COLL VENOUS BLD VENIPUNCTURE: CPT

## 2022-09-07 PROCEDURE — 6360000004 HC RX CONTRAST MEDICATION: Performed by: NURSE PRACTITIONER

## 2022-09-07 PROCEDURE — 74177 CT ABD & PELVIS W/CONTRAST: CPT

## 2022-09-07 RX ORDER — KETOROLAC TROMETHAMINE 15 MG/ML
15 INJECTION, SOLUTION INTRAMUSCULAR; INTRAVENOUS ONCE
Status: COMPLETED | OUTPATIENT
Start: 2022-09-07 | End: 2022-09-07

## 2022-09-07 RX ORDER — 0.9 % SODIUM CHLORIDE 0.9 %
70 INTRAVENOUS SOLUTION INTRAVENOUS ONCE
Status: COMPLETED | OUTPATIENT
Start: 2022-09-07 | End: 2022-09-07

## 2022-09-07 RX ORDER — SODIUM CHLORIDE 0.9 % (FLUSH) 0.9 %
10 SYRINGE (ML) INJECTION PRN
Status: DISCONTINUED | OUTPATIENT
Start: 2022-09-07 | End: 2022-09-07 | Stop reason: HOSPADM

## 2022-09-07 RX ADMIN — SODIUM CHLORIDE 70 ML: 9 INJECTION, SOLUTION INTRAVENOUS at 19:47

## 2022-09-07 RX ADMIN — IOPAMIDOL 75 ML: 755 INJECTION, SOLUTION INTRAVENOUS at 19:47

## 2022-09-07 RX ADMIN — SODIUM CHLORIDE, PRESERVATIVE FREE 10 ML: 5 INJECTION INTRAVENOUS at 19:48

## 2022-09-07 RX ADMIN — KETOROLAC TROMETHAMINE 15 MG: 15 INJECTION, SOLUTION INTRAMUSCULAR; INTRAVENOUS at 19:57

## 2022-09-07 ASSESSMENT — ENCOUNTER SYMPTOMS
VOMITING: 0
ANAL BLEEDING: 0
ABDOMINAL PAIN: 1
NAUSEA: 1
DIARRHEA: 0
RESPIRATORY NEGATIVE: 1
BLOOD IN STOOL: 0
RECTAL PAIN: 0
ABDOMINAL DISTENTION: 0
CONSTIPATION: 0
EYES NEGATIVE: 1

## 2022-09-07 ASSESSMENT — PAIN DESCRIPTION - PAIN TYPE: TYPE: ACUTE PAIN

## 2022-09-07 ASSESSMENT — PAIN DESCRIPTION - LOCATION: LOCATION: ABDOMEN

## 2022-09-07 ASSESSMENT — PAIN SCALES - GENERAL
PAINLEVEL_OUTOF10: 9
PAINLEVEL_OUTOF10: 6
PAINLEVEL_OUTOF10: 9

## 2022-09-07 ASSESSMENT — PAIN DESCRIPTION - ORIENTATION: ORIENTATION: LOWER

## 2022-09-07 ASSESSMENT — PAIN DESCRIPTION - DESCRIPTORS: DESCRIPTORS: ACHING;SHARP

## 2022-09-07 ASSESSMENT — PAIN - FUNCTIONAL ASSESSMENT: PAIN_FUNCTIONAL_ASSESSMENT: 0-10

## 2022-09-07 ASSESSMENT — PAIN DESCRIPTION - FREQUENCY: FREQUENCY: INTERMITTENT

## 2022-09-07 NOTE — ED PROVIDER NOTES
[facility]  [address] [facility]  Phone: [phone]        Pt Name: Jorge Hernandes  MRN: [id number]  [place] 1995  Date of evaluation: 9/7/22    Akbar Molina       Chief Complaint   Patient presents with    Abdominal Pain       HISTORY OF PRESENT ILLNESS (Location/Symptom, Timing/Onset, Context/Setting, Quality, Duration, Modifying Factors, Severity)      Jorge Hernandes is a 33 y.o. female with no pertinent PMH who presents to the ED via private auto with complaints of right lower quadrant abdominal pain which started earlier today. She reports some nausea, no vomiting. Denies any diarrhea. States her last bowel movement was earlier today and normal for her. Denies any fever chills or body aches. Denies any chest pain or shortness of breath. Denies any headache dizziness or lightheadedness. Patient denies any vaginal symptoms such as abnormal bleeding or discharge. Denies any dysuria or increase in urinary frequency/urgency. PAST MEDICAL / SURGICAL / SOCIAL / FAMILY HISTORY     PMH:  has a past medical history of Depression and Neurocardiogenic syncope. Surgical History:  has no past surgical history on file. Social History:  reports that she has never smoked. She has never used smokeless tobacco. She reports current alcohol use. She reports that she does not use drugs. Family History: She indicated that the status of her maternal grandmother is unknown. She indicated that the status of her maternal grandfather is unknown. She indicated that the status of her paternal grandmother is unknown.   family history includes Diabetes in her maternal grandfather and maternal grandmother; Hypertension in her maternal grandmother; Thyroid Disease in her maternal grandmother and paternal grandmother. Psychiatric History: None    Allergies: Patient has no known allergies. Home Medications:   Prior to Admission medications    Medication Sig Start Date End Date Taking? sounds. Pulmonary:      Effort: Pulmonary effort is normal.      Breath sounds: Normal breath sounds. Abdominal:      General: Bowel sounds are normal. There is no distension. Palpations: Abdomen is soft. There is no mass. Tenderness: There is abdominal tenderness. There is no right CVA tenderness, left CVA tenderness or guarding. Positive signs include McBurney's sign. Negative signs include Rovsing's sign. Comments: Right lower quadrant pain with palpation over McBurney's point   Musculoskeletal:         General: Normal range of motion. Cervical back: Normal range of motion. Skin:     General: Skin is warm. Capillary Refill: Capillary refill takes less than 2 seconds. Neurological:      Mental Status: She is alert and oriented to person, place, and time. Psychiatric:         Mood and Affect: Mood normal.         Behavior: Behavior normal.         Thought Content: Thought content normal.         Judgment: Judgment normal.         DIFFERENTIAL DIAGNOSIS / MDM     Upon exam, patient is resting in her room. She appears nontoxic and no distress is noted. Heart sounds the normal limits upon auscultation. Lung sounds are clear and equal bilaterally. Bowel sounds are present in all 4 quadrants auscultation. No abdominal distention or guarding is noted. Upper extremity strength equal bilaterally. Lower extremity strength equal bilaterally. Pupils equal round reactive to light. EOM's intact. Patient's gait is steady. Patient does experience tenderness over McBurney's point with palpation. No rebound tenderness noted. As it above, patient denies any vaginal symptoms including discharge or abnormal bleeding. She denies any pregnancy concerns. She is on oral birth control. Patient reports her pain is a 9 out of 10, will give her some Toradol for her symptoms. I will order a CT of the abdomen pelvis, urinalysis, CBC, CMP, lipase and serum pregnancy.     Serum pregnancy is negative. CBC unremarkable. CMP is unremarkable. CBC unremarkable. Urinalysis negative for urinary tract infection. CT of the abdomen pelvis showing no acute intra-abdominal abnormality. There is a small right adnexal cyst per radiologist read. Patient reports improvement of her symptoms after the Toradol. She states that her mind is at ease because she was very worried that it was appendicitis. Reassessment, patient continues to have no pain in her adnexal region. She denies any nausea or vomiting. Educated patient to follow-up with her primary care physician within the next day or so. Return to the emergency department with any worsening abdominal pain, back pain, nausea, vomiting, fever, chills, chest pain, shortness of breath or any other new concerning worsening symptoms. Patient states understanding of education we following up outpatient with her PCP as well as her OB/GYN. PLAN (LABS / IMAGING / EKG):  Orders Placed This Encounter   Procedures    CT ABDOMEN PELVIS W IV CONTRAST Additional Contrast? None    Urinalysis with Reflex to Culture    CBC with Auto Differential    Comprehensive Metabolic Panel    Lipase    HCG Qualitative, Serum       MEDICATIONS ORDERED:  Orders Placed This Encounter   Medications    ketorolac (TORADOL) injection 15 mg    iopamidol (ISOVUE-370) 76 % injection 75 mL    0.9 % sodium chloride bolus    sodium chloride flush 0.9 % injection 10 mL         Controlled Substances Monitoring:     DIAGNOSTIC RESULTS     EKG: All EKG's are interpreted by the Emergency Department Physician who either signs or Co-signs this chart in the absenceof a cardiologist.      RADIOLOGY: All images are read by the radiologist and their interpretations are reviewed. CT ABDOMEN PELVIS W IV CONTRAST Additional Contrast? None   Final Result   1. No acute intra-abdominal abnormality.    2. Small right adnexal cyst.      RECOMMENDATIONS:      1.9 cm simple      Pathology: 1.9 cm right adnexal simple-appearing cyst.      No follow-up imaging is recommended. Reference: JACR 2020 Feb;17(2):248-254             No results found. LABS:  Results for orders placed or performed during the hospital encounter of 09/07/22   Urinalysis with Reflex to Culture    Specimen: Urine, clean catch   Result Value Ref Range    Color, UA Yellow Yellow    Turbidity UA Clear Clear    Glucose, Ur NEGATIVE NEGATIVE    Bilirubin Urine NEGATIVE NEGATIVE    Ketones, Urine NEGATIVE NEGATIVE    Specific Gravity, UA 1.012 1.005 - 1.030    Urine Hgb NEGATIVE NEGATIVE    pH, UA 5.5 5.0 - 8.0    Protein, UA NEGATIVE NEGATIVE    Urobilinogen, Urine Normal Normal    Nitrite, Urine NEGATIVE NEGATIVE    Leukocyte Esterase, Urine NEGATIVE NEGATIVE    Urinalysis Comments       Microscopic exam not performed based on chemical results unless requested in original order. Urinalysis Comments          Urinalysis Comments       Utilizing a urinalysis as the only screening method to exclude a potential uropathogen can be unreliable in many patient populations. Rapid screening tests are less sensitive than culture and if UTI is a clinical possibility, culture should be considered despite a negative urinalysis.      CBC with Auto Differential   Result Value Ref Range    WBC 8.7 3.5 - 11.0 k/uL    RBC 4.43 4.0 - 5.2 m/uL    Hemoglobin 12.9 12.0 - 16.0 g/dL    Hematocrit 39.6 36 - 46 %    MCV 89.5 80 - 100 fL    MCH 29.2 26 - 34 pg    MCHC 32.6 31 - 37 g/dL    RDW 13.4 12.5 - 15.4 %    Platelets 925 206 - 153 k/uL    MPV 8.4 6.0 - 12.0 fL    Seg Neutrophils 65 36 - 66 %    Lymphocytes 24 24 - 44 %    Monocytes 8 2 - 11 %    Eosinophils % 2 1 - 4 %    Basophils 1 0 - 2 %    Segs Absolute 5.80 1.8 - 7.7 k/uL    Absolute Lymph # 2.10 1.0 - 4.8 k/uL    Absolute Mono # 0.70 0.1 - 1.2 k/uL    Absolute Eos # 0.10 0.0 - 0.4 k/uL    Basophils Absolute 0.00 0.0 - 0.2 k/uL   Comprehensive Metabolic Panel   Result Value Ref Range    Glucose 83 70 - 99 mg/dL    BUN 13 6 - 20 mg/dL    Creatinine 0.60 0.50 - 0.90 mg/dL    Calcium 9.7 8.6 - 10.4 mg/dL    Sodium 140 135 - 144 mmol/L    Potassium 4.4 3.7 - 5.3 mmol/L    Chloride 102 98 - 107 mmol/L    CO2 29 20 - 31 mmol/L    Anion Gap 9 9 - 17 mmol/L    Alkaline Phosphatase 76 35 - 104 U/L    ALT 13 5 - 33 U/L    AST 19 <32 U/L    Total Bilirubin <0.1 (L) 0.3 - 1.2 mg/dL    Total Protein 7.4 6.4 - 8.3 g/dL    Albumin 4.6 3.5 - 5.2 g/dL    Albumin/Globulin Ratio 1.6 1.0 - 2.5    GFR Non-African American >60 >60 mL/min    GFR African American >60 >60 mL/min    GFR Comment         Lipase   Result Value Ref Range    Lipase 28 13 - 60 U/L   HCG Qualitative, Serum   Result Value Ref Range    hCG Qual NEGATIVE NEGATIVE       EMERGENCY DEPARTMENT COURSE           Vitals:    Vitals:    09/07/22 1859   BP: 135/80   Pulse: 80   Resp: 16   Temp: 98.4 °F (36.9 °C)   TempSrc: Oral   SpO2: 100%   Weight: 59.9 kg (132 lb)   Height: 4' 11\" (1.499 m)     -------------------------  BP: 135/80, Temp: 98.4 °F (36.9 °C), Heart Rate: 80, Resp: 16      RE-EVALUATION:  See ED Course notes above. CONSULTS:  None    PROCEDURES:  None    FINAL IMPRESSION      1. Right lower quadrant abdominal pain          DISPOSITION / PLAN     CONDITION ON DISPOSITION:   Good / Stable for discharge.     PATIENT REFERRED TO:  Susan Loyola MD  [address] [place] [id number] [phone]    Schedule an appointment as soon as possible for a visit       [facility] ED  [address] [place] [address],[facility] [address] [phone]  Go to   If symptoms worsen    DISCHARGE MEDICATIONS:  Current Discharge Medication List          Janneth Hernandez APRN - NP   Emergency Medicine Nurse Practitioner    (Please note that portions of this note were completed with a voice recognition program.  Efforts were made to edit the dictations but occasionally words aremis-transcribed.)      Janneth Hernandez, APRN - NP  09/07/22 2042

## 2022-09-07 NOTE — ED NOTES
Pt ambulates with steady gait to ED room 6 with c/o constant, RLQ abd pain that started this morning. Pt denies any nausea, vomiting, dysuria, or any other complaints at this time. Pt A&Ox4. Pt sitting on cot. RR even and non labored. NAD noted at this time. Family at bedside. Call light within reach.       Buck Flowers RN  09/07/22 3756

## 2023-04-13 ENCOUNTER — HOSPITAL ENCOUNTER (EMERGENCY)
Facility: CLINIC | Age: 28
Discharge: HOME | End: 2023-04-13
Attending: SPECIALIST
Payer: COMMERCIAL

## 2023-04-13 VITALS
DIASTOLIC BLOOD PRESSURE: 65 MMHG | WEIGHT: 132 LBS | TEMPERATURE: 98.5 F | OXYGEN SATURATION: 99 % | SYSTOLIC BLOOD PRESSURE: 131 MMHG | HEART RATE: 79 BPM | HEIGHT: 60 IN | RESPIRATION RATE: 16 BRPM | BODY MASS INDEX: 25.91 KG/M2

## 2023-04-13 DIAGNOSIS — H66.91 ACUTE RIGHT OTITIS MEDIA: Primary | ICD-10-CM

## 2023-04-13 PROCEDURE — 99283 EMERGENCY DEPT VISIT LOW MDM: CPT

## 2023-04-13 PROCEDURE — 6370000000 HC RX 637 (ALT 250 FOR IP): Performed by: SPECIALIST

## 2023-04-13 RX ORDER — AMOXICILLIN 250 MG/1
500 CAPSULE ORAL ONCE
Status: COMPLETED | OUTPATIENT
Start: 2023-04-13 | End: 2023-04-13

## 2023-04-13 RX ORDER — AMOXICILLIN 500 MG/1
500 CAPSULE ORAL 3 TIMES DAILY
Qty: 30 CAPSULE | Refills: 0 | Status: SHIPPED | OUTPATIENT
Start: 2023-04-13 | End: 2023-04-23

## 2023-04-13 RX ADMIN — AMOXICILLIN 500 MG: 250 CAPSULE ORAL at 19:46

## 2023-04-13 ASSESSMENT — PAIN DESCRIPTION - PAIN TYPE: TYPE: ACUTE PAIN

## 2023-04-13 ASSESSMENT — PAIN DESCRIPTION - ONSET: ONSET: ON-GOING

## 2023-04-13 ASSESSMENT — PAIN - FUNCTIONAL ASSESSMENT
PAIN_FUNCTIONAL_ASSESSMENT: 0-10
PAIN_FUNCTIONAL_ASSESSMENT: ACTIVITIES ARE NOT PREVENTED

## 2023-04-13 ASSESSMENT — PAIN DESCRIPTION - DESCRIPTORS: DESCRIPTORS: ACHING

## 2023-04-13 ASSESSMENT — PAIN DESCRIPTION - LOCATION: LOCATION: EAR

## 2023-04-13 ASSESSMENT — PAIN SCALES - GENERAL: PAINLEVEL_OUTOF10: 8

## 2023-04-13 ASSESSMENT — PAIN DESCRIPTION - FREQUENCY: FREQUENCY: CONTINUOUS

## 2023-04-13 ASSESSMENT — PAIN DESCRIPTION - ORIENTATION: ORIENTATION: RIGHT;LEFT

## 2023-04-13 NOTE — ED NOTES
Pt presents to ED c/o bilateral ear pain for the past few days. Pt reports taking tylenol and ibuprofen with little effect on symptoms. Ears do not exhibit excessive wax and do not appear red/irritated. Pt is afebrile. Pt arrives A/Ox4, PWD, PMS intact. Pt resting on stretcher with call light in reach.      Stephannie Severe, RN  04/13/23 1952

## 2023-04-13 NOTE — ED PROVIDER NOTES
DISPOSITION Decision To Discharge 04/13/2023 07:25:13 PM      Condition on Disposition    Stable    PATIENT REFERRED TO:  Maryannshaquille Mohan, [address] [address]  [phone]    Call in 3 days  For reevaluation of current symptoms    [facility] ED  / [place]   [phone]    If symptoms worsen      DISCHARGE MEDICATIONS:  New Prescriptions    AMOXICILLIN (AMOXIL) 500 MG CAPSULE    Take 1 capsule by mouth 3 times daily for 10 days       (Please note that portions of this note were completed with a voice recognition program.  Efforts were made to edit the dictations but occasionally words are mis-transcribed.)    Stella Guerrero MD,, MD, F.A.C.E.P.   Attending Emergency Physician      Stella Guerrero MD  04/14/23 0402

## 2023-04-13 NOTE — DISCHARGE INSTRUCTIONS
Please understand that at this time there is no evidence for a more serious underlying process, but that early in the process of an illness or injury, an emergency department workup can be falsely reassuring. You should contact your family doctor within the next 48 hours for a follow up appointment    [place]!!!    From [facility] ([facility]) and [facility] Emergency Services    On behalf of the Emergency Department staff at [facility]), I would like to thank you for giving us the opportunity to address your health care needs and concerns. We hope that during your visit, our service was delivered in a professional and caring manner. Please keep [facility] ([facility]) in mind as we walk with you down the path to your own personal wellness. Please expect an automated text message or email from us so we can ask a few questions about your health and progress. Based on your answers, a clinician may call you back to offer help and instructions. Please understand that early in the process of an illness or injury, an emergency department workup can be falsely reassuring. If you notice any worsening, changing or persistent symptoms please call your family doctor or return to the ER immediately. Tell us how we did during your visit at [email or web address]   and let us know about your experience

## 2023-04-14 ASSESSMENT — ENCOUNTER SYMPTOMS
SHORTNESS OF BREATH: 0
SORE THROAT: 0
COUGH: 0
NAUSEA: 0
ABDOMINAL PAIN: 0

## 2024-04-13 ENCOUNTER — HOSPITAL ENCOUNTER (EMERGENCY)
Facility: CLINIC | Age: 29
Discharge: HOME | End: 2024-04-13
Attending: EMERGENCY MEDICINE

## 2024-04-13 VITALS
DIASTOLIC BLOOD PRESSURE: 70 MMHG | HEART RATE: 68 BPM | RESPIRATION RATE: 18 BRPM | SYSTOLIC BLOOD PRESSURE: 121 MMHG | HEIGHT: 60 IN | TEMPERATURE: 97.5 F | OXYGEN SATURATION: 99 % | BODY MASS INDEX: 26.5 KG/M2 | WEIGHT: 135 LBS

## 2024-04-13 DIAGNOSIS — R51.9 NONINTRACTABLE HEADACHE, UNSPECIFIED CHRONICITY PATTERN, UNSPECIFIED HEADACHE TYPE: Primary | ICD-10-CM

## 2024-04-13 PROCEDURE — 6360000002 HC RX W HCPCS: Performed by: PHYSICIAN ASSISTANT

## 2024-04-13 PROCEDURE — 2580000003 HC RX 258: Performed by: PHYSICIAN ASSISTANT

## 2024-04-13 PROCEDURE — 99284 EMERGENCY DEPT VISIT MOD MDM: CPT

## 2024-04-13 PROCEDURE — 96375 TX/PRO/DX INJ NEW DRUG ADDON: CPT

## 2024-04-13 PROCEDURE — 96361 HYDRATE IV INFUSION ADD-ON: CPT

## 2024-04-13 PROCEDURE — 96374 THER/PROPH/DIAG INJ IV PUSH: CPT

## 2024-04-13 RX ORDER — PROCHLORPERAZINE EDISYLATE 5 MG/ML
10 INJECTION INTRAMUSCULAR; INTRAVENOUS ONCE
Status: COMPLETED | OUTPATIENT
Start: 2024-04-13 | End: 2024-04-13

## 2024-04-13 RX ORDER — DIPHENHYDRAMINE HYDROCHLORIDE 50 MG/ML
25 INJECTION INTRAMUSCULAR; INTRAVENOUS ONCE
Status: COMPLETED | OUTPATIENT
Start: 2024-04-13 | End: 2024-04-13

## 2024-04-13 RX ORDER — DEXAMETHASONE SODIUM PHOSPHATE 10 MG/ML
10 INJECTION, SOLUTION INTRAMUSCULAR; INTRAVENOUS ONCE
Status: COMPLETED | OUTPATIENT
Start: 2024-04-13 | End: 2024-04-13

## 2024-04-13 RX ORDER — 0.9 % SODIUM CHLORIDE 0.9 %
1000 INTRAVENOUS SOLUTION INTRAVENOUS ONCE
Status: COMPLETED | OUTPATIENT
Start: 2024-04-13 | End: 2024-04-13

## 2024-04-13 RX ORDER — KETOROLAC TROMETHAMINE 30 MG/ML
30 INJECTION, SOLUTION INTRAMUSCULAR; INTRAVENOUS ONCE
Status: COMPLETED | OUTPATIENT
Start: 2024-04-13 | End: 2024-04-13

## 2024-04-13 RX ADMIN — DEXAMETHASONE SODIUM PHOSPHATE 10 MG: 10 INJECTION INTRAMUSCULAR; INTRAVENOUS at 10:41

## 2024-04-13 RX ADMIN — KETOROLAC TROMETHAMINE 30 MG: 30 INJECTION, SOLUTION INTRAMUSCULAR at 10:41

## 2024-04-13 RX ADMIN — DIPHENHYDRAMINE HYDROCHLORIDE 25 MG: 50 INJECTION, SOLUTION INTRAMUSCULAR; INTRAVENOUS at 10:41

## 2024-04-13 RX ADMIN — PROCHLORPERAZINE EDISYLATE 10 MG: 5 INJECTION INTRAMUSCULAR; INTRAVENOUS at 10:41

## 2024-04-13 RX ADMIN — SODIUM CHLORIDE 1000 ML: 9 INJECTION, SOLUTION INTRAVENOUS at 10:38

## 2024-04-13 ASSESSMENT — PAIN - FUNCTIONAL ASSESSMENT: PAIN_FUNCTIONAL_ASSESSMENT: 0-10

## 2024-04-13 ASSESSMENT — PAIN DESCRIPTION - LOCATION: LOCATION: HEAD

## 2024-04-13 ASSESSMENT — PAIN DESCRIPTION - ORIENTATION: ORIENTATION: LEFT

## 2024-04-13 ASSESSMENT — PAIN DESCRIPTION - DESCRIPTORS: DESCRIPTORS: THROBBING

## 2024-04-13 ASSESSMENT — PAIN SCALES - GENERAL: PAINLEVEL_OUTOF10: 10

## 2024-04-13 NOTE — ED PROVIDER NOTES
[facility]    [address] [facility] [address]  Phone: [phone]  Emergency Department  Faculty Attestation    I performed a history and physical examination of the patient and discussed management with the mid level provider. I reviewed the mid level provider's note and agree with the documented findings and plan of care. Any areas of disagreement are noted on the chart. I was personally present for the key portions of any procedures. I have documented in the chart those procedures where I was not present during the key portions. I have reviewed the emergency nurses triage note. I agree with the chief complaint, past medical history, past surgical history, allergies, medications, social and family history as documented unless otherwise noted below. Documentation of the HPI, Physical Exam and Medical Decision Making performed by medical students or scribes is based on my personal performance of the HPI, PE and MDM. For Physician Assistant/ Nurse Practitioner cases/documentation I have personally evaluated this patient and have completed at least one if not all key elements of the E/M (history, physical exam, and MDM). Additional findings are as noted.      Primary Care Physician:  Amber Mccallum MD    CHIEF COMPLAINT       Chief Complaint   Patient presents with    Migraine     Pt presents to ED with complaints of lt sided head pain that radiates \"towards the top of my head.\" Pt reports nausea and vomiting and photophobia.        RECENT VITALS:   Temp: 97.5 °F (36.4 °C),  Pulse: 68, Respirations: 18, BP: 121/70    LABS:  Labs Reviewed - No data to display      PERTINENT ATTENDING PHYSICIAN COMMENTS:    The patient presents for a migraine headache.  This is a typical headache for her.  It is on the left side of her head and radiates to the top of her head.  She has had a history of migraines but has not seen a neurologist about it.  She is not on any abortive medications.  The patient reports  photosensitivity with nausea and vomiting.    On exam, the patient prefers to sit in a darkened room.  She has no nuchal rigidity and normal heart and lung sounds.  She has no focal deficits.  The patient received IV fluids and Toradol, Compazine, Benadryl, and Decadron with improvement.  This appears to be a typical migraine headache for the patient.  I recommend that she follow-up with a neurologist.  Referral was provided.  The patient is discharged in good condition.     Shayy Loya MD  04/14/24 0791

## 2024-04-13 NOTE — ED PROVIDER NOTES
[facility]  EMERGENCY DEPARTMENT ENCOUNTER      Pt Name: Maty Mathis  MRN: [id number]  Birthdate 1995  Date of evaluation: 4/13/24      CHIEF COMPLAINT:   Chief Complaint   Patient presents with    Migraine     Pt presents to ED with complaints of lt sided head pain that radiates \"towards the top of my head.\" Pt reports nausea and vomiting and photophobia.      HISTORY OF PRESENT ILLNESS    Maty Mathis is a 29 y.o. female who presents with headache that started 3 days ago. The headache is not the \"worst headache of life\". It is typical in character to previous headaches. It has been gradually worsening and was not \"thunder clap\" in origin at all. There has been no syncope associated either.  Pt describes the pain as left sided. Came in for treatment due to length of symptoms.   Has never seen neurology or headache specialist in past.  States  has a lot of stress    REVIEW OF SYSTEMS     Constitutional: Denies recent fever, chills.  Eyes: No visual changes.    Neck: No neck pain.   Respiratory: Denies recent shortness of breath.    Cardiac:  Denies recent chest pain.    GI: denies any recent abdominal pain, + nausea, - vomiting.  Denies Blood in the stool or black tarry stools.    : denies dysuria.    Musculoskeletal: Denies focal weakness.    Neurologic: c/o headache  Skin:  Denies any rash.      Negative in 10 essential Systems except as mentioned above and in the HPI.      PAST MEDICAL HISTORY   PMH:  has a past medical history of Depression and Neurocardiogenic syncope. none otherwise stated from nurses notes  Surgical History:  has no past surgical history on file. none otherwise stated from nurses notes  Social History:  reports that she has never smoked. She has never used smokeless tobacco. She reports that she does not currently use alcohol. She reports that she does not use drugs. , lives at home with others  Family History: none  Psychiatric History: none    Allergies:is  allergic to tomato.    PHYSICAL EXAM     INITIAL VITALS: /70   Pulse 68   Temp 97.5 °F (36.4 °C) (Oral)   Resp 18   Ht 1.524 m (5')   Wt 61.2 kg (135 lb)   LMP 03/15/2024   SpO2 99%   BMI 26.37 kg/m²   Constitutional:  Well developed, A&O times 3  Eyes:  Pupils equal and readily reactive to light at 3 mm bilaterally  HENT:  Atraumatic, external ears normal, nose normal.    Neck:  Supple with absolutely no meningismus.  Respiratory:  Clear to auscultation bilaterally with good air exchange  Cardiovascular:  RRR with normal S1 and S2  GI:  Soft, nondistended/normal BS, and nontender   Musculoskeletal:  No edema, no tenderness, no deformities.   Back:  No CVA tenderness.  Normal to inspection.    Integument:  No rash.  Palms are clear bilaterally  Neuro examination:  Patient is alert and oriented ×3.  Extraocular movements intact.  Pupils are equal and reactive to light bilaterally.  Speech is normal.  No word finding difficulties noted.  Eyebrows raise symmetrically.  Patient is able to puff out cheeks symmetrically with no issues.  Tongue protrudes to midline.  Shoulder shrug is symmetrical.  Right upper extremity: 5/5 strength with finger abduction, flexion and extension of elbow. Left upper extremity: 5/5 strength with finger abduction, flexion and extension of elbow.  Sensation intact to upper extremities.  Patient able to lift both legs off the bed and hold for 5 seconds without issues.  5/5 strength with big toe dorsiflexion bilaterally.  Sensation to light touch intact to bilateral lower extremities.     EMERGENCY DEPARTMENT COURSE:     Orders Placed This Encounter   Medications    sodium chloride 0.9 % bolus 1,000 mL    ketorolac (TORADOL) injection 30 mg    prochlorperazine (COMPAZINE) injection 10 mg    diphenhydrAMINE (BENADRYL) injection 25 mg    dexAMETHasone (PF) (DECADRON) injection 10 mg       Pt feeling better.   Mother driving her home.   Needs note for work    The patient presents with

## 2024-08-20 ENCOUNTER — HOSPITAL ENCOUNTER (EMERGENCY)
Facility: CLINIC | Age: 29
Discharge: HOME | End: 2024-08-20
Attending: EMERGENCY MEDICINE

## 2024-08-20 VITALS
SYSTOLIC BLOOD PRESSURE: 116 MMHG | DIASTOLIC BLOOD PRESSURE: 77 MMHG | BODY MASS INDEX: 27.88 KG/M2 | OXYGEN SATURATION: 98 % | TEMPERATURE: 98 F | RESPIRATION RATE: 15 BRPM | HEIGHT: 60 IN | HEART RATE: 66 BPM | WEIGHT: 142 LBS

## 2024-08-20 DIAGNOSIS — M72.2 PLANTAR FASCIITIS OF RIGHT FOOT: Primary | ICD-10-CM

## 2024-08-20 PROCEDURE — 99282 EMERGENCY DEPT VISIT SF MDM: CPT

## 2024-08-20 ASSESSMENT — PAIN - FUNCTIONAL ASSESSMENT: PAIN_FUNCTIONAL_ASSESSMENT: 0-10

## 2024-08-20 ASSESSMENT — PAIN DESCRIPTION - PAIN TYPE: TYPE: ACUTE PAIN

## 2024-08-20 ASSESSMENT — PAIN DESCRIPTION - LOCATION: LOCATION: FOOT

## 2024-08-20 ASSESSMENT — PAIN DESCRIPTION - DESCRIPTORS: DESCRIPTORS: ACHING;SHARP

## 2024-08-20 ASSESSMENT — PAIN SCALES - GENERAL: PAINLEVEL_OUTOF10: 7

## 2024-08-20 ASSESSMENT — PAIN DESCRIPTION - FREQUENCY: FREQUENCY: CONTINUOUS

## 2024-08-20 NOTE — DISCHARGE INSTRUCTIONS
Call today or tomorrow to follow up with Amber Mccallum MD  in 2-3 days.    Use an ice pack or bag filled with ice and apply to the injured area 3 - 4 times a day for 15 - 20 minutes each time.    Use ibuprofen or Tylenol (unless prescribed medications that have Tylenol in it) for pain.  You can take over the counter Ibuprofen (advil) tablets (4 every 8 hours or 3 every 6 hours or 2 every 4 hours)    Use your boot for the next several days until you are able to take 10 steps without pain.    Return to the Emergency Department for worsening of pain, increase swelling to the foot, inability to move your toes, any other care or concern.

## 2024-08-20 NOTE — ED NOTES
Pt presents to the ED via private auto. Pt states she has been experiencing right foot pain x1 week. Pt denies any injury.

## 2024-08-20 NOTE — ED PROVIDER NOTES
[facility]  [address] [id number]  Phone: [phone]      Pt Name: Maty Mathis  MRN:[id number]  Birthdate 1995  Date of evaluation: 8/20/2024      CHIEF COMPLAINT       Chief Complaint   Patient presents with    Foot Pain     Right x1 week       HISTORY OF PRESENT ILLNESS   Maty Mathis is a 29 y.o. female who presents for evaluation of right foot pain.  The patient reports that starting approximately 2 weeks ago she began having pain with ambulation to her right heel that radiates to her midfoot.  Her pain is worse when she takes for steps in the morning.  The patient has been taking ibuprofen and Tylenol without much improvement.  She denies any injury or surgery to her right foot.  The patient does not list any other provoking or palliating factors.  She denies any new shoes.  The patient denies fever, chills, headache, vision changes, neck pain, back pain, chest pain, shortness of breath, abdominal pain, urinary/bowel symptoms, focal weakness, numbness, tingling, recent injury or illness.    REVIEW OF SYSTEMS     Positive: Right heel pain  Ten point review of systems was reviewed and is negative unless otherwise noted in the HPI    PAST MEDICAL HISTORY    has a past medical history of Depression and Neurocardiogenic syncope.    SURGICAL HISTORY      has no past surgical history on file.  Patient denies    CURRENT MEDICATIONS       Discharge Medication List as of 8/20/2024  7:58 PM        CONTINUE these medications which have NOT CHANGED    Details   ibuprofen (ADVIL;MOTRIN) 600 MG tablet Take 1 tablet by mouth every 6 hours as needed for Pain, Disp-30 tablet, R-0Print             ALLERGIES     is allergic to tomato.    FAMILY HISTORY     She indicated that the status of her maternal grandmother is unknown. She indicated that the status of her maternal grandfather is unknown. She indicated that the status of her paternal grandmother is unknown.     family history includes